# Patient Record
Sex: MALE | Race: WHITE | URBAN - METROPOLITAN AREA
[De-identification: names, ages, dates, MRNs, and addresses within clinical notes are randomized per-mention and may not be internally consistent; named-entity substitution may affect disease eponyms.]

---

## 2021-11-29 ENCOUNTER — OFFICE VISIT (OUTPATIENT)
Dept: FAMILY MEDICINE CLINIC | Facility: CLINIC | Age: 51
End: 2021-11-29
Payer: COMMERCIAL

## 2021-11-29 VITALS
BODY MASS INDEX: 30.06 KG/M2 | RESPIRATION RATE: 20 BRPM | WEIGHT: 210 LBS | HEIGHT: 70 IN | SYSTOLIC BLOOD PRESSURE: 137 MMHG | OXYGEN SATURATION: 95 % | TEMPERATURE: 98 F | DIASTOLIC BLOOD PRESSURE: 87 MMHG | HEART RATE: 98 BPM

## 2021-11-29 DIAGNOSIS — M54.50 ACUTE RIGHT-SIDED LOW BACK PAIN WITHOUT SCIATICA: Primary | ICD-10-CM

## 2021-11-29 PROCEDURE — 3075F SYST BP GE 130 - 139MM HG: CPT | Performed by: NURSE PRACTITIONER

## 2021-11-29 PROCEDURE — 99202 OFFICE O/P NEW SF 15 MIN: CPT | Performed by: NURSE PRACTITIONER

## 2021-11-29 PROCEDURE — 3008F BODY MASS INDEX DOCD: CPT | Performed by: NURSE PRACTITIONER

## 2021-11-29 PROCEDURE — 3079F DIAST BP 80-89 MM HG: CPT | Performed by: NURSE PRACTITIONER

## 2021-11-29 RX ORDER — CYCLOBENZAPRINE HCL 10 MG
10 TABLET ORAL NIGHTLY
Qty: 14 TABLET | Refills: 0 | Status: SHIPPED | OUTPATIENT
Start: 2021-11-29 | End: 2021-12-13

## 2021-11-29 RX ORDER — NAPROXEN SODIUM 550 MG/1
550 TABLET ORAL 2 TIMES DAILY WITH MEALS
Qty: 60 TABLET | Refills: 0 | Status: SHIPPED | OUTPATIENT
Start: 2021-11-29 | End: 2021-12-29

## 2021-11-29 NOTE — PATIENT INSTRUCTIONS
Back Care Tips     Caring for your back  These are things you can do to prevent a recurrence of acute back pain and to reduce symptoms from chronic back pain:  · Stay at a healthy weight.  If you are overweight, losing weight will help most types of back careful if you are given prescription pain medicines, opioids, or medicine for muscle spasm. They can cause drowsiness, and affect your coordination, reflexes, and judgment. Don't drive or operate heavy machinery while taking these types of medicines.  Take pillows under your knees to support your legs in a slightly flexed position. Use a firm mattress. If your mattress sags, replace it, or use a 1/2-inch plywood board under the mattress to add support.   Follow-up care  Follow up with your healthcare provider care  · As soon as possible, start sitting or walking again. This will help prevent problems from a long bed rest. These problems include muscle weakness, worsening back stiffness and pain, and blood clots in the legs.   · When in bed, try to find a positio especially if you have other medical problems or are taking other medicines. You may use over-the-counter medicines such as acetaminophen, ibuprofen, or naprosyn to control pain, unless your healthcare provider prescribed another pain medicine.  Talk with

## 2021-11-29 NOTE — PROGRESS NOTES
CHIEF COMPLAINT:     Patient presents with:  Back Pain: Entered by patient      HPI:   Minnie Cam is a 54year old male who is here for complaints of mid low back pain x 1 week. dull pain, moved wrong way, sharp pain. No h/o low back pain.   Pain is descr murmur  EXTREMITIES: no cyanosis, clubbing or edema  BACK: lumbosacral tenderness right, limited flexion, extension, lateral bend and rotation d/t pain  NEURO: no focal deficits. Sensation intact.   PSYCH: pleasant mood and affect    ASSESSMENT:   Varghese Knowles massage can help relax the back muscles without stretching them. · During the first 24 to 72 hours after an acute injury or flare-up of chronic back pain, put an ice pack on the painful area for 20 minutes and then remove it for 20 minutes.  Do thisover a the object close to your body, directly in front of you. · Straighten your legs to lift the object.   · Lower the object to the floor in the reverse fashion. · If you must slide something across the floor, push it.     Posture tips  Sitting  Sit in chairs [id number] [facility] 4037. All rights reserved. This information is not intended as a substitute for professional medical care. Always follow your healthcare professional's instructions.         Back Spasm (No Trauma)    Spasm of the back muscles Heat from a hot shower, hot bath, or heating pad reduces pain and works well for muscle spasms. Put heat on the painful area for 20 minutes, then remove it for 20 minutes. Do this over a period of 60 to 90 minutes, or several times a day.  Don't sleep on a findings that may affect your care.   Call   Call if any of these occur:  · Trouble breathing  · Confusion  · Drowsiness or trouble awakening  · Fainting or loss of consciousness  · Rapid or very slow heart rate  · Loss of bowel or bladder control  When to